# Patient Record
Sex: MALE | Race: BLACK OR AFRICAN AMERICAN | NOT HISPANIC OR LATINO | Employment: OTHER | URBAN - METROPOLITAN AREA
[De-identification: names, ages, dates, MRNs, and addresses within clinical notes are randomized per-mention and may not be internally consistent; named-entity substitution may affect disease eponyms.]

---

## 2023-08-02 ENCOUNTER — HOSPITAL ENCOUNTER (OUTPATIENT)
Dept: DATA CONVERSION | Facility: HOSPITAL | Age: 78
End: 2023-08-02
Attending: INTERNAL MEDICINE | Admitting: INTERNAL MEDICINE
Payer: COMMERCIAL

## 2023-08-02 DIAGNOSIS — I25.5 ISCHEMIC CARDIOMYOPATHY: ICD-10-CM

## 2023-08-02 DIAGNOSIS — I25.82 CHRONIC TOTAL OCCLUSION OF CORONARY ARTERY: ICD-10-CM

## 2023-08-02 DIAGNOSIS — I10 ESSENTIAL (PRIMARY) HYPERTENSION: ICD-10-CM

## 2023-08-02 DIAGNOSIS — I42.9 CARDIOMYOPATHY, UNSPECIFIED (MULTI): ICD-10-CM

## 2023-08-02 DIAGNOSIS — I48.91 UNSPECIFIED ATRIAL FIBRILLATION (MULTI): ICD-10-CM

## 2023-08-02 DIAGNOSIS — I20.89 OTHER FORMS OF ANGINA PECTORIS (CMS-HCC): ICD-10-CM

## 2023-08-02 DIAGNOSIS — I25.118 ATHEROSCLEROTIC HEART DISEASE OF NATIVE CORONARY ARTERY WITH OTHER FORMS OF ANGINA PECTORIS (CMS-HCC): ICD-10-CM

## 2023-08-02 DIAGNOSIS — E78.5 HYPERLIPIDEMIA, UNSPECIFIED: ICD-10-CM

## 2023-08-02 DIAGNOSIS — E11.9 TYPE 2 DIABETES MELLITUS WITHOUT COMPLICATIONS (MULTI): ICD-10-CM

## 2023-08-02 DIAGNOSIS — I25.10 ATHEROSCLEROTIC HEART DISEASE OF NATIVE CORONARY ARTERY WITHOUT ANGINA PECTORIS: ICD-10-CM

## 2023-08-05 LAB
ATRIAL RATE: 62 BPM
P AXIS: 33 DEGREES
P OFFSET: 171 MS
P ONSET: 129 MS
PR INTERVAL: 232 MS
Q ONSET: 212 MS
QRS COUNT: 10 BEATS
QRS DURATION: 98 MS
QT INTERVAL: 462 MS
QTC CALCULATION(BAZETT): 468 MS
QTC FREDERICIA: 467 MS
R AXIS: -38 DEGREES
T AXIS: 231 DEGREES
T OFFSET: 443 MS
VENTRICULAR RATE: 62 BPM

## 2023-09-29 VITALS — HEIGHT: 70 IN | WEIGHT: 163.14 LBS | BODY MASS INDEX: 23.36 KG/M2

## 2023-11-02 PROBLEM — Z95.810 ICD (IMPLANTABLE CARDIOVERTER-DEFIBRILLATOR) IN PLACE: Status: ACTIVE | Noted: 2023-11-02

## 2023-11-02 PROBLEM — I25.5 ISCHEMIC CARDIOMYOPATHY: Status: ACTIVE | Noted: 2023-11-02

## 2023-11-02 PROBLEM — I65.23 STENOSIS OF BOTH EXTERNAL CAROTID ARTERIES: Status: ACTIVE | Noted: 2023-11-02

## 2023-11-02 PROBLEM — I48.0 PAROXYSMAL ATRIAL FIBRILLATION WITH RAPID VENTRICULAR RESPONSE (MULTI): Status: ACTIVE | Noted: 2023-11-02

## 2023-11-02 PROBLEM — E11.9 TYPE 2 DIABETES MELLITUS (MULTI): Status: ACTIVE | Noted: 2023-11-02

## 2023-11-02 PROBLEM — R06.00 DYSPNEA: Status: ACTIVE | Noted: 2023-11-02

## 2023-11-02 PROBLEM — I47.20 VENTRICULAR TACHYCARDIA (PAROXYSMAL): Status: ACTIVE | Noted: 2023-11-02

## 2023-11-02 PROBLEM — I10 ESSENTIAL HYPERTENSION, BENIGN: Status: ACTIVE | Noted: 2023-11-02

## 2023-11-02 PROBLEM — Z87.891 FORMER SMOKER: Status: ACTIVE | Noted: 2023-11-02

## 2023-11-02 PROBLEM — I47.29 VENTRICULAR TACHYCARDIA (PAROXYSMAL) (MULTI): Status: ACTIVE | Noted: 2023-11-02

## 2023-11-02 PROBLEM — I25.810 CORONARY ARTERY DISEASE INVOLVING CORONARY BYPASS GRAFT OF NATIVE HEART: Status: ACTIVE | Noted: 2023-11-02

## 2023-11-02 PROBLEM — Z98.61 HISTORY OF PTCA: Status: ACTIVE | Noted: 2023-11-02

## 2023-11-02 RX ORDER — FLUTICASONE PROPIONATE 50 MCG
1 SPRAY, SUSPENSION (ML) NASAL 2 TIMES DAILY
COMMUNITY
Start: 2023-01-13

## 2023-11-02 RX ORDER — EZETIMIBE 10 MG/1
1 TABLET ORAL NIGHTLY
COMMUNITY
Start: 2021-01-15

## 2023-11-02 RX ORDER — SPIRONOLACTONE 25 MG/1
25 TABLET ORAL DAILY
COMMUNITY
End: 2023-11-03 | Stop reason: ALTCHOICE

## 2023-11-02 RX ORDER — MECLIZINE HYDROCHLORIDE 25 MG/1
1 TABLET ORAL 3 TIMES DAILY PRN
COMMUNITY
Start: 2021-05-21

## 2023-11-02 RX ORDER — AMIODARONE HYDROCHLORIDE 200 MG/1
200 TABLET ORAL 2 TIMES DAILY
COMMUNITY

## 2023-11-02 RX ORDER — CETIRIZINE HYDROCHLORIDE 10 MG/1
10 TABLET ORAL DAILY
COMMUNITY
Start: 2023-01-13 | End: 2023-11-03 | Stop reason: ALTCHOICE

## 2023-11-02 RX ORDER — NITROGLYCERIN 0.4 MG/1
TABLET SUBLINGUAL
COMMUNITY

## 2023-11-02 RX ORDER — SACUBITRIL AND VALSARTAN 24; 26 MG/1; MG/1
1 TABLET, FILM COATED ORAL DAILY
COMMUNITY

## 2023-11-02 RX ORDER — LINAGLIPTIN 5 MG/1
5 TABLET, FILM COATED ORAL DAILY
COMMUNITY

## 2023-11-02 RX ORDER — ALOGLIPTIN 12.5 MG/1
1 TABLET, FILM COATED ORAL DAILY
COMMUNITY
End: 2023-11-03 | Stop reason: ALTCHOICE

## 2023-11-02 RX ORDER — PANTOPRAZOLE SODIUM 40 MG/1
1 TABLET, DELAYED RELEASE ORAL DAILY
COMMUNITY
Start: 2021-03-23 | End: 2023-11-03 | Stop reason: ALTCHOICE

## 2023-11-02 RX ORDER — IPRATROPIUM BROMIDE 21 UG/1
2 SPRAY, METERED NASAL 2 TIMES DAILY
COMMUNITY
Start: 2023-03-14 | End: 2023-11-03 | Stop reason: ALTCHOICE

## 2023-11-02 RX ORDER — ALPRAZOLAM 1 MG/1
1 TABLET ORAL NIGHTLY PRN
COMMUNITY
Start: 2020-11-24

## 2023-11-02 RX ORDER — METFORMIN HYDROCHLORIDE 1000 MG/1
500 TABLET ORAL 2 TIMES DAILY
COMMUNITY
Start: 2020-07-22 | End: 2024-05-07 | Stop reason: ALTCHOICE

## 2023-11-02 RX ORDER — CLOPIDOGREL BISULFATE 75 MG/1
75 TABLET ORAL DAILY
COMMUNITY
End: 2023-11-29

## 2023-11-02 RX ORDER — SILDENAFIL 100 MG/1
TABLET, FILM COATED ORAL
COMMUNITY

## 2023-11-02 RX ORDER — GLIPIZIDE 10 MG/1
1 TABLET, FILM COATED, EXTENDED RELEASE ORAL 2 TIMES DAILY
COMMUNITY
Start: 2020-04-28 | End: 2023-11-03 | Stop reason: ALTCHOICE

## 2023-11-02 RX ORDER — ASPIRIN 81 MG/1
1 TABLET ORAL DAILY
COMMUNITY
End: 2023-11-03 | Stop reason: ALTCHOICE

## 2023-11-02 RX ORDER — ATORVASTATIN CALCIUM 40 MG/1
1 TABLET, FILM COATED ORAL NIGHTLY
COMMUNITY
Start: 2021-01-18

## 2023-11-02 RX ORDER — BENZONATATE 200 MG/1
200 CAPSULE ORAL 3 TIMES DAILY PRN
COMMUNITY
Start: 2023-01-13 | End: 2024-02-20 | Stop reason: WASHOUT

## 2023-11-02 RX ORDER — TRAZODONE HYDROCHLORIDE 50 MG/1
50 TABLET ORAL NIGHTLY
COMMUNITY
Start: 2023-02-14 | End: 2023-11-03 | Stop reason: ALTCHOICE

## 2023-11-02 RX ORDER — SOTALOL HYDROCHLORIDE 80 MG/1
80 TABLET ORAL EVERY 12 HOURS
COMMUNITY
End: 2023-11-03 | Stop reason: ALTCHOICE

## 2023-11-03 ENCOUNTER — OFFICE VISIT (OUTPATIENT)
Dept: CARDIOLOGY | Facility: CLINIC | Age: 78
End: 2023-11-03
Payer: MEDICARE

## 2023-11-03 VITALS
SYSTOLIC BLOOD PRESSURE: 100 MMHG | HEART RATE: 84 BPM | WEIGHT: 166.4 LBS | BODY MASS INDEX: 23.88 KG/M2 | DIASTOLIC BLOOD PRESSURE: 60 MMHG

## 2023-11-03 DIAGNOSIS — E78.2 MIXED HYPERLIPIDEMIA: ICD-10-CM

## 2023-11-03 DIAGNOSIS — Z95.810 ICD (IMPLANTABLE CARDIOVERTER-DEFIBRILLATOR) IN PLACE: ICD-10-CM

## 2023-11-03 DIAGNOSIS — I46.9 CARDIAC ARREST (MULTI): ICD-10-CM

## 2023-11-03 DIAGNOSIS — I65.23 STENOSIS OF BOTH EXTERNAL CAROTID ARTERIES: ICD-10-CM

## 2023-11-03 DIAGNOSIS — I77.9 CAROTID ARTERY DISEASE, UNSPECIFIED LATERALITY, UNSPECIFIED TYPE (CMS-HCC): ICD-10-CM

## 2023-11-03 DIAGNOSIS — I10 ESSENTIAL HYPERTENSION, BENIGN: ICD-10-CM

## 2023-11-03 DIAGNOSIS — I47.29 VENTRICULAR TACHYCARDIA (PAROXYSMAL) (MULTI): ICD-10-CM

## 2023-11-03 DIAGNOSIS — E11.9 TYPE 2 DIABETES MELLITUS WITHOUT COMPLICATION, WITHOUT LONG-TERM CURRENT USE OF INSULIN (MULTI): ICD-10-CM

## 2023-11-03 DIAGNOSIS — E11.22 CHRONIC KIDNEY DISEASE DUE TO TYPE 2 DIABETES MELLITUS (MULTI): ICD-10-CM

## 2023-11-03 DIAGNOSIS — I25.810 CORONARY ARTERY DISEASE INVOLVING CORONARY BYPASS GRAFT OF NATIVE HEART WITHOUT ANGINA PECTORIS: ICD-10-CM

## 2023-11-03 DIAGNOSIS — Z87.891 FORMER SMOKER: ICD-10-CM

## 2023-11-03 DIAGNOSIS — Z98.61 CAD S/P PERCUTANEOUS CORONARY ANGIOPLASTY: ICD-10-CM

## 2023-11-03 DIAGNOSIS — I25.10 CAD S/P PERCUTANEOUS CORONARY ANGIOPLASTY: ICD-10-CM

## 2023-11-03 DIAGNOSIS — I25.5 ISCHEMIC CARDIOMYOPATHY: ICD-10-CM

## 2023-11-03 DIAGNOSIS — K21.9 GASTROESOPHAGEAL REFLUX DISEASE, UNSPECIFIED WHETHER ESOPHAGITIS PRESENT: ICD-10-CM

## 2023-11-03 PROBLEM — F41.9 ANXIETY: Status: ACTIVE | Noted: 2023-11-03

## 2023-11-03 PROBLEM — K92.2 GI BLEED: Status: ACTIVE | Noted: 2020-11-24

## 2023-11-03 PROBLEM — K21.00 GASTRO-ESOPHAGEAL REFLUX DISEASE WITH ESOPHAGITIS: Status: ACTIVE | Noted: 2021-05-20

## 2023-11-03 PROBLEM — E78.5 HYPERLIPIDEMIA: Status: ACTIVE | Noted: 2023-01-13

## 2023-11-03 PROBLEM — I25.9 CHRONIC ISCHEMIC HEART DISEASE, UNSPECIFIED: Status: ACTIVE | Noted: 2023-11-03

## 2023-11-03 PROBLEM — G51.0 BELL'S PALSY: Status: ACTIVE | Noted: 2021-05-20

## 2023-11-03 PROCEDURE — 99214 OFFICE O/P EST MOD 30 MIN: CPT | Performed by: INTERNAL MEDICINE

## 2023-11-03 PROCEDURE — 3074F SYST BP LT 130 MM HG: CPT | Performed by: INTERNAL MEDICINE

## 2023-11-03 PROCEDURE — 3078F DIAST BP <80 MM HG: CPT | Performed by: INTERNAL MEDICINE

## 2023-11-03 PROCEDURE — 1036F TOBACCO NON-USER: CPT | Performed by: INTERNAL MEDICINE

## 2023-11-03 PROCEDURE — 1159F MED LIST DOCD IN RCRD: CPT | Performed by: INTERNAL MEDICINE

## 2023-11-03 NOTE — PROGRESS NOTES
Referred by Dr. Bruno ref. provider found provider found for   Chief Complaint   Patient presents with    Follow-up     3 month        History of Present Illness  Henry Ortiz is a 78 y.o. year old male patient status post bypass surgery status post ICD implantation.  He had ICD shock back in August cardiac catheterization showed no change in his graft.  He had ischemic or myopathy.  He has been doing well no complaint no symptoms of chest pain or shortness of breath he is taking amiodarone.  I discussed with the patient in length we will continue medication will call for any problem and follow-up as scheduled.  The patient been followed by EP service    Past Medical History  No past medical history on file.    Social History  Social History     Tobacco Use    Smoking status: Former     Packs/day: 1.00     Years: 20.00     Additional pack years: 0.00     Total pack years: 20.00     Types: Cigarettes     Quit date:      Years since quittin.8    Smokeless tobacco: Never   Substance Use Topics    Alcohol use: Not Currently    Drug use: Not Currently       Family History     Family History   Problem Relation Name Age of Onset    Heart attack Mother          acute    Cancer Father      Coronary artery disease Sister         Review of Systems  As per HPI, all other systems reviewed and negative.    Allergies:  Allergies   Allergen Reactions    Bee Venom Protein (Honey Bee) Other    Alogliptin Other        Outpatient Medications:  Current Outpatient Medications   Medication Instructions    ALPRAZolam (XANAX) 1 mg, oral, Nightly PRN    amiodarone (PACERONE) 200 mg, oral, 2 times daily, Through 23, then take 1 tab daily thereafter    apixaban (ELIQUIS) 5 mg, oral, 2 times daily    atorvastatin (Lipitor) 40 mg tablet 1 tablet, oral, Nightly    benzonatate (TESSALON) 200 mg, oral, 3 times daily PRN    clopidogrel (PLAVIX) 75 mg, oral, Daily    empagliflozin (JARDIANCE) 12.5 mg, oral, Daily    ezetimibe (Zetia)  10 mg tablet 1 tablet, oral, Nightly    fluticasone (Flonase) 50 mcg/actuation nasal spray 1 spray, Each Nostril, 2 times daily    meclizine (Antivert) 25 mg tablet 1 tablet, oral, 3 times daily PRN    metFORMIN (GLUCOPHAGE) 500 mg, oral, 2 times daily    nitroglycerin (Nitrostat) 0.4 mg SL tablet sublingual    sacubitriL-valsartan (Entresto) 24-26 mg tablet 1 tablet, oral, Daily    sildenafil (Viagra) 100 mg tablet oral, AS DIRECTED.    Tradjenta 5 mg, oral, Daily         Vitals:  Vitals:    11/03/23 1537   BP: 100/60   Pulse: 84       Physical Exam:  Physical Exam  Constitutional:       Appearance: Normal appearance.   HENT:      Head: Normocephalic and atraumatic.   Eyes:      Extraocular Movements: Extraocular movements intact.      Pupils: Pupils are equal, round, and reactive to light.   Cardiovascular:      Rate and Rhythm: Normal rate and regular rhythm.      Pulses: Normal pulses.      Heart sounds: Normal heart sounds.   Pulmonary:      Effort: Pulmonary effort is normal.      Breath sounds: Normal breath sounds.   Abdominal:      General: Abdomen is flat.      Palpations: Abdomen is soft.   Musculoskeletal:      Right lower leg: No edema.      Left lower leg: No edema.   Skin:     General: Skin is warm and dry.   Neurological:      General: No focal deficit present.      Mental Status: He is alert and oriented to person, place, and time.             Assessment/Plan   Diagnoses and all orders for this visit:  CAD S/P percutaneous coronary angioplasty  Cardiac arrest (CMS/HCC)  Essential hypertension, benign  Coronary artery disease involving coronary bypass graft of native heart without angina pectoris  Carotid artery disease, unspecified laterality, unspecified type (CMS/HCC)  Ventricular tachycardia (paroxysmal) (CMS/HCC)  Stenosis of both external carotid arteries  Ischemic cardiomyopathy  ICD (implantable cardioverter-defibrillator) in place  Mixed hyperlipidemia  Type 2 diabetes mellitus without  complication, without long-term current use of insulin (CMS/[facility])  Gastroesophageal reflux disease, unspecified whether esophagitis present  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  Interventional Cardiology   of [facility]     Thank you for allowing me to participate in the care of this patient. Please do not hesitate to contact me with any further questions or concerns.

## 2023-11-03 NOTE — PATIENT INSTRUCTIONS
Patient to follow up in 6 months with Dr. Remington Nelson MD [facility]     No changes to plans today.   Continue same medications and treatments.   Patient educated on proper medication use.   Patient educated on risk factor modification.   Please bring any lab results from other providers / physicians to your next appointment.     Please bring all medicines, vitamins, and herbal supplements with you when you come to the office.     Prescriptions will not be filled unless you are compliant with your follow up appointments or have a follow up appointment scheduled as per instruction of your physician. Refills should be requested at the time of your visit.    Angel JIMENEZ RN am scribing for and in the presence of Dr. [facility]

## 2023-11-27 DIAGNOSIS — Z98.61 CAD S/P PERCUTANEOUS CORONARY ANGIOPLASTY: ICD-10-CM

## 2023-11-27 DIAGNOSIS — I25.10 CAD S/P PERCUTANEOUS CORONARY ANGIOPLASTY: ICD-10-CM

## 2023-11-29 RX ORDER — CLOPIDOGREL BISULFATE 75 MG/1
75 TABLET ORAL DAILY
Qty: 90 TABLET | Refills: 3 | Status: SHIPPED | OUTPATIENT
Start: 2023-11-29

## 2023-12-13 ENCOUNTER — HOSPITAL ENCOUNTER (OUTPATIENT)
Dept: CARDIOLOGY | Facility: HOSPITAL | Age: 78
Discharge: HOME | End: 2023-12-13
Payer: MEDICARE

## 2023-12-13 DIAGNOSIS — Z95.810 ICD (IMPLANTABLE CARDIOVERTER-DEFIBRILLATOR) IN PLACE: ICD-10-CM

## 2023-12-13 DIAGNOSIS — I42.0 PRIMARY IDIOPATHIC DILATED CARDIOMYOPATHY (MULTI): ICD-10-CM

## 2023-12-13 DIAGNOSIS — Z95.810 ICD (IMPLANTABLE CARDIOVERTER-DEFIBRILLATOR) IN PLACE: Primary | ICD-10-CM

## 2023-12-13 PROCEDURE — 93296 REM INTERROG EVL PM/IDS: CPT

## 2023-12-13 PROCEDURE — 93295 DEV INTERROG REMOTE 1/2/MLT: CPT | Performed by: INTERNAL MEDICINE

## 2024-02-20 ENCOUNTER — OFFICE VISIT (OUTPATIENT)
Dept: CARDIOLOGY | Facility: CLINIC | Age: 79
End: 2024-02-20
Payer: COMMERCIAL

## 2024-02-20 ENCOUNTER — ANCILLARY PROCEDURE (OUTPATIENT)
Dept: CARDIOLOGY | Facility: CLINIC | Age: 79
End: 2024-02-20
Payer: COMMERCIAL

## 2024-02-20 VITALS
BODY MASS INDEX: 24.37 KG/M2 | SYSTOLIC BLOOD PRESSURE: 140 MMHG | WEIGHT: 170.2 LBS | TEMPERATURE: 97.3 F | DIASTOLIC BLOOD PRESSURE: 90 MMHG | HEIGHT: 70 IN | HEART RATE: 85 BPM

## 2024-02-20 DIAGNOSIS — Z95.810 PRESENCE OF AUTOMATIC CARDIOVERTER/DEFIBRILLATOR (AICD): ICD-10-CM

## 2024-02-20 DIAGNOSIS — I42.9 PRIMARY CARDIOMYOPATHY (MULTI): ICD-10-CM

## 2024-02-20 DIAGNOSIS — Z95.810 ICD (IMPLANTABLE CARDIOVERTER-DEFIBRILLATOR) IN PLACE: Primary | ICD-10-CM

## 2024-02-20 PROCEDURE — 3080F DIAST BP >= 90 MM HG: CPT | Performed by: INTERNAL MEDICINE

## 2024-02-20 PROCEDURE — 93283 PRGRMG EVAL IMPLANTABLE DFB: CPT | Performed by: INTERNAL MEDICINE

## 2024-02-20 PROCEDURE — 1159F MED LIST DOCD IN RCRD: CPT | Performed by: INTERNAL MEDICINE

## 2024-02-20 PROCEDURE — 1036F TOBACCO NON-USER: CPT | Performed by: INTERNAL MEDICINE

## 2024-02-20 PROCEDURE — 3077F SYST BP >= 140 MM HG: CPT | Performed by: INTERNAL MEDICINE

## 2024-02-20 PROCEDURE — 99213 OFFICE O/P EST LOW 20 MIN: CPT | Performed by: INTERNAL MEDICINE

## 2024-02-20 PROCEDURE — 93290 INTERROG DEV EVAL ICPMS IP: CPT | Performed by: INTERNAL MEDICINE

## 2024-02-20 ASSESSMENT — PATIENT HEALTH QUESTIONNAIRE - PHQ9
2. FEELING DOWN, DEPRESSED OR HOPELESS: NOT AT ALL
1. LITTLE INTEREST OR PLEASURE IN DOING THINGS: NOT AT ALL
SUM OF ALL RESPONSES TO PHQ9 QUESTIONS 1 AND 2: 0

## 2024-02-20 NOTE — PROGRESS NOTES
Subjective:  The patient is a 78-year-old -American gentleman who presents for ICD follow-up.  He has known coronary disease, status post CABG surgeries in 1991 and again in 1999.  He has had multiple percutaneous interventions as well.  He was catheterized by Dr. Nelson in September 2022 and again in August 2023, showing occlusion of all 3 native vessels but good patency of a DELILAH graft to the RCA and SVG to the LAD.  His LVEF is 35 to 45%.    In 2019, the patient had classic Wenke Bach second-degree AV block but also had sustained monomorphic VT following RCA intervention.  I implanted a Medtronic DDDR ICD and started the patient on empiric sotalol.  He had appropriate life-saving shocks on 7/10/2023 and again on 8/30/2023, with ventricular fibrillation on each occasion.  In September 2023, his sotalol was switched to amiodarone, which she has continued since then.  He declined to go back into cardiac rehab, as one of his events occurred in that setting.  He is able to do normal activities without limitations, and is hoping to get back into playing golf this year.    Current Outpatient Medications   Medication Sig    ALPRAZolam (Xanax) 1 mg tablet Take 1 tablet (1 mg) by mouth as needed at bedtime for anxiety or sleep.    amiodarone (Pacerone) 200 mg tablet Take 1 tablet (200 mg) by mouth 2 times a day. Through 9/30/23, then take 1 tab daily thereafter    apixaban (Eliquis) 5 mg tablet Take 1 tablet (5 mg) by mouth 2 times a day.    atorvastatin (Lipitor) 40 mg tablet Take 1 tablet (40 mg) by mouth once daily at bedtime.    clopidogrel (Plavix) 75 mg tablet TAKE 1 TABLET EVERY DAY    empagliflozin (Jardiance) 25 mg Take 0.5 tablets (12.5 mg) by mouth once daily.    ezetimibe (Zetia) 10 mg tablet Take 1 tablet (10 mg) by mouth once daily at bedtime.    fluticasone (Flonase) 50 mcg/actuation nasal spray Administer 1 spray into each nostril 2 times a day. prn    linaGLIPtin (Tradjenta) 5 mg tablet Take 1  tablet (5 mg) by mouth once daily.    meclizine (Antivert) 25 mg tablet Take 1 tablet (25 mg) by mouth 3 times a day as needed for dizziness or nausea.    metFORMIN (Glucophage) 1,000 mg tablet Take 0.5 tablets (500 mg) by mouth twice a day.    nitroglycerin (Nitrostat) 0.4 mg SL tablet Place under the tongue.    sacubitriL-valsartan (Entresto) 24-26 mg tablet Take 1 tablet by mouth once daily.    sildenafil (Viagra) 100 mg tablet Take by mouth. AS DIRECTED.     Allergies:  Bee venom protein (honey bee) and Alogliptin     Patient Active Problem List   Diagnosis    Coronary artery disease involving coronary bypass graft of native heart    Dyspnea    Essential hypertension, benign    ICD (implantable cardioverter-defibrillator) in place    Ischemic cardiomyopathy    Stenosis of both external carotid arteries    Type 2 diabetes mellitus (CMS/HCC)    Ventricular tachycardia (paroxysmal) (CMS/HCC)    Paroxysmal atrial fibrillation with rapid ventricular response (CMS/HCC)    Former smoker    Bell's palsy    Anxiety    Cardiac arrest (CMS/HCC)    Carotid artery disease (CMS/HCC)    Chronic ischemic heart disease, unspecified    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 (CMS/HCC)    Gastro-esophageal reflux disease with esophagitis    GERD (gastroesophageal reflux disease)    GI bleed    Hyperlipidemia    CAD S/P percutaneous coronary angioplasty    BMI 23.0-23.9, adult     Past Surgical History:   Procedure Laterality Date    OTHER SURGICAL HISTORY  10/11/2021    Cardiac catheterization    OTHER SURGICAL HISTORY  10/11/2021    Facial surgery    OTHER SURGICAL HISTORY  10/11/2021    Foot surgery    OTHER SURGICAL HISTORY  10/11/2021    Percutaneous transluminal coronary angioplasty    OTHER SURGICAL HISTORY  10/11/2021    Hernia repair    OTHER SURGICAL HISTORY  10/11/2021    Colonoscopy    OTHER SURGICAL HISTORY  10/11/2021    Surgery    OTHER SURGICAL HISTORY  10/11/2021    Bladder surgery    OTHER SURGICAL  "HISTORY  10/11/2021    Tonsillectomy    OTHER SURGICAL HISTORY  10/11/2021    Prostate biopsy    OTHER SURGICAL HISTORY  10/11/2021    Pacemaker insertion    OTHER SURGICAL HISTORY  04/26/2022    Renal lithotripsy     Objective:  Vitals:    02/20/24 1409   BP: 140/90   Pulse: 85   Temp: 36.3 °C (97.3 °F)   Height:     1.778 m (5' 10\")  Weight: 77.2 kg (170 lb 3.2 oz)     Exam:  Gen: Very pleasant gentleman in no distress.  HEENT: No scleral icterus.  Neck: No jugular venous distention or thyromegaly.  Left subclavian ICD pocket: Normal in appearance.  Lungs: Clear to auscultation, with no wheezes or rales.  Heart: Regular rhythm with laterally displaced PMI but no murmurs or gallops.  Abdomen: Benign, with no organomegaly or masses.  Extremities: Intact distal pulses; no edema.  Neuro: No focal neurologic abnormalities.  Skin: No cutaneous lesions.    Device Check:  A Medtronic ICD check was done today and demonstrated normal device function.  The unit is programmed for AAIR (MVP) pacing between 60 bpm and 120 bpm, which provides nearly 55% atrial pacing and nearly 9% ventricular pacing.  The lead parameters were good.  There was no burden of atrial fibrillation.  The battery longevity is estimated at 4.1 years.    Impressions:  1.  Coronary artery disease, status post remote CABG surgeries and RCA stenting in 2019, with stable anatomy by catheterization in 2023.  2.  Chronic ischemic cardiomyopathy with LVEF 35-45%, but minimal heart failure symptoms (NYHA functional class I).  3.  Ventricular arrhythmias with monomorphic VT in December 2019, and ventricular fibrillation episodes on 2 occasions in the summer 2023.  He is now on amiodarone for suppression of all of these arrhythmias and doing well.  4.  Status post Medtronic DDDR ICD in December 2019, with 2 life-saving shocks in 2023.  The device function is normal, roughly MCC to the point of battery depletion.  5.  Paroxysmal atrial fibrillation by history, " with no burden of this rhythm on low-dose amiodarone.  He has a [id number] score of at least 6 (hypertension, diabetes, CAD, CHF, and 2 points for age over 75), and he is appropriately anticoagulated with Eliquis.  6.  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    Recommendations:  1.  The patient will have his device checked remotely every 3 months and he will see me in the office on an annual basis.  2.  He will follow-up with Dr. Mccabe as scheduled.  3.  I recommend liver and thyroid function testing at least once annually because of the ongoing amiodarone therapy.      Cirilo Rodriguez MD

## 2024-03-22 ENCOUNTER — APPOINTMENT (OUTPATIENT)
Dept: CARDIOLOGY | Facility: CLINIC | Age: 79
End: 2024-03-22
Payer: COMMERCIAL

## 2024-05-07 ENCOUNTER — OFFICE VISIT (OUTPATIENT)
Dept: CARDIOLOGY | Facility: CLINIC | Age: 79
End: 2024-05-07
Payer: COMMERCIAL

## 2024-05-07 VITALS
HEART RATE: 66 BPM | HEIGHT: 70 IN | WEIGHT: 170.3 LBS | DIASTOLIC BLOOD PRESSURE: 66 MMHG | BODY MASS INDEX: 24.38 KG/M2 | SYSTOLIC BLOOD PRESSURE: 104 MMHG

## 2024-05-07 DIAGNOSIS — E11.9 TYPE 2 DIABETES MELLITUS WITHOUT COMPLICATION, WITHOUT LONG-TERM CURRENT USE OF INSULIN (MULTI): ICD-10-CM

## 2024-05-07 DIAGNOSIS — Z87.891 FORMER SMOKER: ICD-10-CM

## 2024-05-07 DIAGNOSIS — I25.5 ISCHEMIC CARDIOMYOPATHY: ICD-10-CM

## 2024-05-07 DIAGNOSIS — I65.23 STENOSIS OF BOTH EXTERNAL CAROTID ARTERIES: ICD-10-CM

## 2024-05-07 DIAGNOSIS — I48.0 PAROXYSMAL ATRIAL FIBRILLATION WITH RAPID VENTRICULAR RESPONSE (MULTI): ICD-10-CM

## 2024-05-07 DIAGNOSIS — E11.22 CHRONIC KIDNEY DISEASE DUE TO TYPE 2 DIABETES MELLITUS (MULTI): ICD-10-CM

## 2024-05-07 DIAGNOSIS — Z98.61 CAD S/P PERCUTANEOUS CORONARY ANGIOPLASTY: ICD-10-CM

## 2024-05-07 DIAGNOSIS — I25.810 CORONARY ARTERY DISEASE INVOLVING CORONARY BYPASS GRAFT OF NATIVE HEART WITHOUT ANGINA PECTORIS: ICD-10-CM

## 2024-05-07 DIAGNOSIS — E78.2 MIXED HYPERLIPIDEMIA: ICD-10-CM

## 2024-05-07 DIAGNOSIS — I10 ESSENTIAL HYPERTENSION, BENIGN: ICD-10-CM

## 2024-05-07 DIAGNOSIS — Z95.810 ICD (IMPLANTABLE CARDIOVERTER-DEFIBRILLATOR) IN PLACE: ICD-10-CM

## 2024-05-07 DIAGNOSIS — I25.10 CAD S/P PERCUTANEOUS CORONARY ANGIOPLASTY: ICD-10-CM

## 2024-05-07 PROBLEM — I25.9 CHRONIC ISCHEMIC HEART DISEASE, UNSPECIFIED: Status: RESOLVED | Noted: 2023-11-03 | Resolved: 2024-05-07

## 2024-05-07 PROCEDURE — 3078F DIAST BP <80 MM HG: CPT | Performed by: INTERNAL MEDICINE

## 2024-05-07 PROCEDURE — 1159F MED LIST DOCD IN RCRD: CPT | Performed by: INTERNAL MEDICINE

## 2024-05-07 PROCEDURE — 3074F SYST BP LT 130 MM HG: CPT | Performed by: INTERNAL MEDICINE

## 2024-05-07 PROCEDURE — 1036F TOBACCO NON-USER: CPT | Performed by: INTERNAL MEDICINE

## 2024-05-07 PROCEDURE — 99214 OFFICE O/P EST MOD 30 MIN: CPT | Performed by: INTERNAL MEDICINE

## 2024-05-07 NOTE — PROGRESS NOTES
Referred by Dr. Bruno ref. provider found provider found for   Chief Complaint   Patient presents with    Follow-up     Pt is following up after 6 months         History of Present Illness  Henry Ortiz is a 78 y.o. year old male patient doing well from cardiac standpoint feeling much better better on Entresto less short of breath and he is able to do more in his ER workup.  Did not have any symptoms of chest pain did not have any further shock of his ICD.  [facility] and [facility] that his GFR is slightly low PSA was elevated.  Advised him to follow-up with urology also to call his primary care physician regarding metformin use when the GFR is on the lower side.  He will follow-up with me in 6 months    Past Medical History  No past medical history on file.    Social History  Social History     Tobacco Use    Smoking status: Former     Current packs/day: 0.00     Average packs/day: 1 pack/day for 20.0 years (20.0 ttl pk-yrs)     Types: Cigarettes     Start date:      Quit date:      Years since quittin.3    Smokeless tobacco: Never   Substance Use Topics    Alcohol use: Yes     Comment: rare    Drug use: Not Currently       Family History     Family History   Problem Relation Name Age of Onset    Heart attack Mother          acute    Cancer Father      Coronary artery disease Sister         Review of Systems  As per HPI, all other systems reviewed and negative.    Allergies:  Allergies   Allergen Reactions    Bee Venom Protein (Honey Bee) Other    Alogliptin Other        Outpatient Medications:  Current Outpatient Medications   Medication Instructions    ALPRAZolam (XANAX) 1 mg, oral, Nightly PRN    amiodarone (PACERONE) 200 mg, oral, 2 times daily, Through 23, then take 1 tab daily thereafter    apixaban (ELIQUIS) 5 mg, oral, 2 times daily    atorvastatin (Lipitor) 40 mg tablet 1 tablet, oral, Nightly    clopidogrel (PLAVIX) 75 mg, oral, Daily    empagliflozin (JARDIANCE)  12.5 mg, oral, Daily    ezetimibe (Zetia) 10 mg tablet 1 tablet, oral, Nightly    fluticasone (Flonase) 50 mcg/actuation nasal spray 1 spray, Each Nostril, 2 times daily, prn    meclizine (Antivert) 25 mg tablet 1 tablet, oral, 3 times daily PRN    nitroglycerin (Nitrostat) 0.4 mg SL tablet sublingual    sacubitriL-valsartan (Entresto) 24-26 mg tablet 1 tablet, oral, Daily    sildenafil (Viagra) 100 mg tablet oral, AS DIRECTED.    Tradjenta 5 mg, oral, Daily         Vitals:  Vitals:    05/07/24 1111   BP: 104/66   Pulse: 66       Physical Exam:  Physical Exam  Constitutional:       Appearance: Normal appearance.   HENT:      Head: Normocephalic and atraumatic.   Eyes:      Extraocular Movements: Extraocular movements intact.      Pupils: Pupils are equal, round, and reactive to light.   Cardiovascular:      Rate and Rhythm: Normal rate and regular rhythm.      Pulses: Normal pulses.      Heart sounds: Normal heart sounds.   Pulmonary:      Effort: Pulmonary effort is normal.      Breath sounds: Normal breath sounds.   Abdominal:      General: Abdomen is flat.      Palpations: Abdomen is soft.   Musculoskeletal:      Right lower leg: No edema.      Left lower leg: No edema.   Skin:     General: Skin is warm and dry.   Neurological:      General: No focal deficit present.      Mental Status: He is alert and oriented to person, place, and time.             Assessment/Plan   Diagnoses and all orders for this visit:  CAD S/P percutaneous coronary angioplasty  Coronary artery disease involving coronary bypass graft of native heart without angina pectoris  ICD (implantable cardioverter-defibrillator) in place  Paroxysmal atrial fibrillation with rapid ventricular response (Multi)  Stenosis of both external carotid arteries  Ischemic cardiomyopathy  Essential hypertension, benign  Mixed hyperlipidemia  Type 2 diabetes mellitus without complication, without long-term current use of insulin (Multi)  Chronic kidney disease due  to type 2 diabetes mellitus (Multi)  Former smoker  BMI 24.0-24.9, adult          Remington Nelson MD [facility]  Interventional Cardiology   of [facility]     Thank you for allowing me to participate in the care of this patient. Please do not hesitate to contact me with any further questions or concerns.

## 2024-05-07 NOTE — PATIENT INSTRUCTIONS
Patient to follow up in 6 months with Dr. Remington Nelson MD [facility]   Continue your follow ups with Dr. Michael MD as scheduled.     [facility] and tell [facility] that you would like to stop Metformin due to your kidneys and start something else.     Please do not take nitroglycerin and Viagra with in the same 24 hours.     No other changes today.   Continue same medications and treatments.   Patient educated on proper medication use.   Patient educated on risk factor modification.   Please bring any lab results from other providers / physicians to your next appointment.     Please bring all medicines, vitamins, and herbal supplements with you when you come to the office.     Prescriptions will not be filled unless you are compliant with your follow up appointments or have a follow up appointment scheduled as per instruction of your physician. Refills should be requested at the time of your visit.    Angel JIMENEZ RN am scribing for and in the presence of Dr. Remington Nelson MD [facility]

## 2024-05-23 ENCOUNTER — HOSPITAL ENCOUNTER (OUTPATIENT)
Dept: CARDIOLOGY | Facility: HOSPITAL | Age: 79
Discharge: HOME | End: 2024-05-23
Payer: COMMERCIAL

## 2024-05-23 DIAGNOSIS — Z95.810 ICD (IMPLANTABLE CARDIOVERTER-DEFIBRILLATOR) IN PLACE: ICD-10-CM

## 2024-05-23 DIAGNOSIS — I42.0 PRIMARY IDIOPATHIC DILATED CARDIOMYOPATHY (MULTI): ICD-10-CM

## 2024-05-23 PROCEDURE — 93296 REM INTERROG EVL PM/IDS: CPT

## 2024-05-23 PROCEDURE — 93295 DEV INTERROG REMOTE 1/2/MLT: CPT | Performed by: INTERNAL MEDICINE

## 2024-07-31 ENCOUNTER — TELEPHONE (OUTPATIENT)
Dept: CARDIOLOGY | Facility: CLINIC | Age: 79
End: 2024-07-31
Payer: COMMERCIAL

## 2024-07-31 NOTE — TELEPHONE ENCOUNTER
Patient called and LM stating he needs to be off of his Plavix and Eliquis d/t needing to get biopsies done from urology. Patient does not have a date yet. Routed to Angel Rivera RN

## 2024-08-08 NOTE — TELEPHONE ENCOUNTER
I LM for patient to wait for procedure to be formally scheduled then return call to our office to discuss blood thinner instructions.   Can also have clearance form (if needed) faxed to our office at that time.     Closing this note.

## 2024-08-22 NOTE — TELEPHONE ENCOUNTER
Patient called and LM stating his procedure is now scheduled for 9/6/24. Routed to Angel Rivera RN

## 2024-08-26 DIAGNOSIS — Z98.61 CAD S/P PERCUTANEOUS CORONARY ANGIOPLASTY: ICD-10-CM

## 2024-08-26 DIAGNOSIS — I25.10 CAD S/P PERCUTANEOUS CORONARY ANGIOPLASTY: ICD-10-CM

## 2024-08-27 RX ORDER — CLOPIDOGREL BISULFATE 75 MG/1
75 TABLET ORAL DAILY
Qty: 90 TABLET | Refills: 3 | Status: SHIPPED | OUTPATIENT
Start: 2024-08-27

## 2024-08-27 NOTE — TELEPHONE ENCOUNTER
Received request for prescription refills for patient.   Patient follows with Dr. Nelson     Request is for Plavix 75mg QD  Is patient currently on medication yes    Last OV 5/7/24  Next OV 11/19/24    Pended for signing and sent to provider

## 2024-08-28 ENCOUNTER — HOSPITAL ENCOUNTER (OUTPATIENT)
Dept: CARDIOLOGY | Facility: HOSPITAL | Age: 79
Discharge: HOME | End: 2024-08-28
Payer: COMMERCIAL

## 2024-08-28 DIAGNOSIS — I42.0 PRIMARY IDIOPATHIC DILATED CARDIOMYOPATHY (MULTI): ICD-10-CM

## 2024-08-28 DIAGNOSIS — Z95.810 ICD (IMPLANTABLE CARDIOVERTER-DEFIBRILLATOR) IN PLACE: ICD-10-CM

## 2024-08-28 PROCEDURE — 93296 REM INTERROG EVL PM/IDS: CPT

## 2024-08-28 PROCEDURE — 93295 DEV INTERROG REMOTE 1/2/MLT: CPT | Performed by: INTERNAL MEDICINE

## 2024-08-30 NOTE — TELEPHONE ENCOUNTER
Reviewed. I do not have clinical notes regarding these biopsies or what exactly is being done.     Patient last seen with Dr. Remington Nelson MD [facility] on 5/7/24:  Henry Ortiz is a 78 y.o. year old male patient doing well from cardiac standpoint feeling much better better on Entresto less short of breath and he is able to do more in his ER workup.  Did not have any symptoms of chest pain did not have any further shock of his ICD.  [facility] and appears that his GFR is slightly low PSA was elevated.  Advised him to follow-up with urology also to call his primary care physician regarding metformin use when the GFR is on the lower side.  He will follow-up with me in 6 months.     Patient is on Eliquis (pxAF) and Plavix (CAD) therapies.   Most recent GFR in the low 40s at [facility] in July 2024    Routing to Dr. Remington Nelson MD [facility] for input.

## 2024-09-04 NOTE — TELEPHONE ENCOUNTER
Remington Nelson MD  You19 hours ago (4:23 PM)     Okay for biopsy can hold Eliquis 3 days prior to procedure and Plavix 7 days preop

## 2024-10-21 DIAGNOSIS — I48.0 PAROXYSMAL ATRIAL FIBRILLATION WITH RAPID VENTRICULAR RESPONSE (MULTI): Primary | ICD-10-CM

## 2024-10-23 RX ORDER — AMIODARONE HYDROCHLORIDE 200 MG/1
200 TABLET ORAL DAILY
Qty: 90 TABLET | Refills: 3 | Status: SHIPPED | OUTPATIENT
Start: 2024-10-23

## 2024-11-19 ENCOUNTER — APPOINTMENT (OUTPATIENT)
Dept: CARDIOLOGY | Facility: CLINIC | Age: 79
End: 2024-11-19
Payer: COMMERCIAL

## 2024-11-19 VITALS
WEIGHT: 178.2 LBS | BODY MASS INDEX: 25.57 KG/M2 | DIASTOLIC BLOOD PRESSURE: 72 MMHG | HEART RATE: 68 BPM | SYSTOLIC BLOOD PRESSURE: 112 MMHG

## 2024-11-19 DIAGNOSIS — Z87.891 FORMER SMOKER: ICD-10-CM

## 2024-11-19 DIAGNOSIS — I10 ESSENTIAL HYPERTENSION, BENIGN: ICD-10-CM

## 2024-11-19 DIAGNOSIS — Z98.61 CAD S/P PERCUTANEOUS CORONARY ANGIOPLASTY: ICD-10-CM

## 2024-11-19 DIAGNOSIS — I65.23 STENOSIS OF BOTH EXTERNAL CAROTID ARTERIES: ICD-10-CM

## 2024-11-19 DIAGNOSIS — Z95.810 ICD (IMPLANTABLE CARDIOVERTER-DEFIBRILLATOR) IN PLACE: ICD-10-CM

## 2024-11-19 DIAGNOSIS — E11.9 TYPE 2 DIABETES MELLITUS WITHOUT COMPLICATION, WITHOUT LONG-TERM CURRENT USE OF INSULIN (MULTI): ICD-10-CM

## 2024-11-19 DIAGNOSIS — I25.10 CAD S/P PERCUTANEOUS CORONARY ANGIOPLASTY: ICD-10-CM

## 2024-11-19 DIAGNOSIS — E78.2 MIXED HYPERLIPIDEMIA: ICD-10-CM

## 2024-11-19 DIAGNOSIS — I48.0 PAROXYSMAL ATRIAL FIBRILLATION WITH RAPID VENTRICULAR RESPONSE (MULTI): ICD-10-CM

## 2024-11-19 DIAGNOSIS — I25.5 ISCHEMIC CARDIOMYOPATHY: ICD-10-CM

## 2024-11-19 PROBLEM — E11.22 CHRONIC KIDNEY DISEASE DUE TO TYPE 2 DIABETES MELLITUS (MULTI): Status: RESOLVED | Noted: 2023-01-13 | Resolved: 2024-11-19

## 2024-11-19 PROCEDURE — 3074F SYST BP LT 130 MM HG: CPT | Performed by: INTERNAL MEDICINE

## 2024-11-19 PROCEDURE — 3078F DIAST BP <80 MM HG: CPT | Performed by: INTERNAL MEDICINE

## 2024-11-19 PROCEDURE — 1036F TOBACCO NON-USER: CPT | Performed by: INTERNAL MEDICINE

## 2024-11-19 PROCEDURE — 99214 OFFICE O/P EST MOD 30 MIN: CPT | Performed by: INTERNAL MEDICINE

## 2024-11-19 PROCEDURE — 1159F MED LIST DOCD IN RCRD: CPT | Performed by: INTERNAL MEDICINE

## 2024-11-19 RX ORDER — METFORMIN HYDROCHLORIDE 500 MG/1
500 TABLET ORAL
COMMUNITY
Start: 2024-10-29

## 2024-11-19 NOTE — PROGRESS NOTES
Referred by Dr. Bruno ref. provider found provider found for   Chief Complaint   Patient presents with    Follow-up     6 month        History of Present Illness  Henry Ortiz is a 79 y.o. year old male patient doing well from a cardiac standpoint no complaint no symptoms of chest pain or shortness of breath.  Status post previous cardiac arrest status post previous ICD implantation.  Did not have any symptoms of palpitations syncope or presyncope.  I discussed with the patient in length we will continue medication will call for any problem at follow-up with the patient that he will need prostate biopsy and he wants it done at the hospital another surgical center.  Possibly safer because of extensive previous cardiac history.  Patient will see me back in few months    Past Medical History  History reviewed. No pertinent past medical history.    Social History  Social History     Tobacco Use    Smoking status: Former     Current packs/day: 0.00     Average packs/day: 1 pack/day for 20.0 years (20.0 ttl pk-yrs)     Types: Cigarettes     Start date:      Quit date:      Years since quittin.9    Smokeless tobacco: Never   Substance Use Topics    Alcohol use: Yes     Comment: rare    Drug use: Not Currently       Family History     Family History   Problem Relation Name Age of Onset    Heart attack Mother          acute    Cancer Father      Coronary artery disease Sister         Review of Systems  As per HPI, all other systems reviewed and negative.    Allergies:  Allergies   Allergen Reactions    Bee Venom Protein (Honey Bee) Other    Alogliptin Other        Outpatient Medications:  Current Outpatient Medications   Medication Instructions    ALPRAZolam (XANAX) 1 mg, Nightly PRN    amiodarone (PACERONE) 200 mg, oral, Daily    apixaban (ELIQUIS) 5 mg, 2 times daily    atorvastatin (Lipitor) 40 mg tablet 1 tablet, Nightly    clopidogrel (PLAVIX) 75 mg, oral, Daily    empagliflozin (JARDIANCE) 12.5 mg, Daily     ezetimibe (Zetia) 10 mg tablet 1 tablet, Nightly    fluticasone (Flonase) 50 mcg/actuation nasal spray 1 spray, 2 times daily    meclizine (Antivert) 25 mg tablet 1 tablet, 3 times daily PRN    metFORMIN (GLUCOPHAGE) 500 mg, Daily with breakfast    nitroglycerin (Nitrostat) 0.4 mg SL tablet Place under the tongue.    sacubitriL-valsartan (Entresto) 24-26 mg tablet 1 tablet, Daily    sildenafil (Viagra) 100 mg tablet Take by mouth. AS DIRECTED.    Tradjenta 5 mg, Daily         Vitals:  Vitals:    11/19/24 1204   BP: 112/72   Pulse: 68       Physical Exam:  Physical Exam  Vitals and nursing note reviewed.   Constitutional:       Appearance: Normal appearance.   HENT:      Head: Normocephalic and atraumatic.   Eyes:      Extraocular Movements: Extraocular movements intact.      Pupils: Pupils are equal, round, and reactive to light.   Cardiovascular:      Rate and Rhythm: Normal rate and regular rhythm.      Pulses: Normal pulses.   Pulmonary:      Effort: Pulmonary effort is normal.      Breath sounds: Normal breath sounds.   Musculoskeletal:         General: Normal range of motion.      Cervical back: Normal range of motion.      Right lower leg: No edema.      Left lower leg: No edema.   Skin:     General: Skin is warm and dry.   Neurological:      General: No focal deficit present.      Mental Status: He is alert and oriented to person, place, and time.             Assessment/Plan   Diagnoses and all orders for this visit:  CAD S/P percutaneous coronary angioplasty  ICD (implantable cardioverter-defibrillator) in place  Paroxysmal atrial fibrillation with rapid ventricular response (Multi)  Stenosis of both external carotid arteries  Ischemic cardiomyopathy  Essential hypertension, benign  Mixed hyperlipidemia  Type 2 diabetes mellitus without complication, without long-term current use of insulin (Multi)  Former smoker  BMI 25.0-25.9,adult          Remington Nelson MD [facility]  Interventional Cardiology   of  [facility] [place]     Thank you for allowing me to participate in the care of this patient. Please do not hesitate to contact me with any further questions or concerns.

## 2024-11-19 NOTE — PATIENT INSTRUCTIONS
Follow up office visit in 6 months.  Continue same medications/treatment.  Patient educated on proper medication use.  Patient educated on risk factor modification.  Please bring any lab results from other providers / physicians to your next appointment.    Please bring all medicines, vitamins and herbal supplements with you when you come to the office.    Prescriptions will not be filled unless you are compliant with your follow up appointments or have a follow up  appointment scheduled as per instruction of your physician.  Refills should be requested at the time of  Your visit.    Rosie JIMENEZ LPN, am scribing for and in the presence of  Dr. Remington Nelson MD, FACC

## 2024-12-05 ENCOUNTER — HOSPITAL ENCOUNTER (OUTPATIENT)
Dept: CARDIOLOGY | Facility: HOSPITAL | Age: 79
Discharge: HOME | End: 2024-12-05
Payer: COMMERCIAL

## 2024-12-05 DIAGNOSIS — I42.0 PRIMARY IDIOPATHIC DILATED CARDIOMYOPATHY (MULTI): ICD-10-CM

## 2024-12-05 DIAGNOSIS — Z95.810 ICD (IMPLANTABLE CARDIOVERTER-DEFIBRILLATOR) IN PLACE: ICD-10-CM

## 2024-12-05 PROCEDURE — 93296 REM INTERROG EVL PM/IDS: CPT

## 2024-12-05 PROCEDURE — 93295 DEV INTERROG REMOTE 1/2/MLT: CPT | Performed by: INTERNAL MEDICINE

## 2024-12-19 ENCOUNTER — TELEPHONE (OUTPATIENT)
Dept: CARDIOLOGY | Facility: CLINIC | Age: 79
End: 2024-12-19
Payer: COMMERCIAL

## 2024-12-19 DIAGNOSIS — Z95.810 ICD (IMPLANTABLE CARDIOVERTER-DEFIBRILLATOR) IN PLACE: ICD-10-CM

## 2025-02-18 ENCOUNTER — OFFICE VISIT (OUTPATIENT)
Dept: CARDIOLOGY | Facility: CLINIC | Age: 80
End: 2025-02-18
Payer: COMMERCIAL

## 2025-02-18 ENCOUNTER — HOSPITAL ENCOUNTER (OUTPATIENT)
Dept: CARDIOLOGY | Facility: HOSPITAL | Age: 80
Discharge: HOME | End: 2025-02-18
Payer: COMMERCIAL

## 2025-02-18 ENCOUNTER — APPOINTMENT (OUTPATIENT)
Dept: CARDIOLOGY | Facility: CLINIC | Age: 80
End: 2025-02-18
Payer: COMMERCIAL

## 2025-02-18 VITALS
OXYGEN SATURATION: 99 % | RESPIRATION RATE: 16 BRPM | BODY MASS INDEX: 25.34 KG/M2 | WEIGHT: 177 LBS | TEMPERATURE: 97.3 F | SYSTOLIC BLOOD PRESSURE: 108 MMHG | HEART RATE: 74 BPM | DIASTOLIC BLOOD PRESSURE: 65 MMHG | HEIGHT: 70 IN

## 2025-02-18 DIAGNOSIS — I10 ESSENTIAL (PRIMARY) HYPERTENSION: ICD-10-CM

## 2025-02-18 DIAGNOSIS — Z95.810 ICD (IMPLANTABLE CARDIOVERTER-DEFIBRILLATOR) IN PLACE: ICD-10-CM

## 2025-02-18 DIAGNOSIS — Z95.810 ICD (IMPLANTABLE CARDIOVERTER-DEFIBRILLATOR) IN PLACE: Primary | ICD-10-CM

## 2025-02-18 PROCEDURE — 93010 ELECTROCARDIOGRAM REPORT: CPT | Performed by: INTERNAL MEDICINE

## 2025-02-18 PROCEDURE — 99214 OFFICE O/P EST MOD 30 MIN: CPT | Mod: 25 | Performed by: INTERNAL MEDICINE

## 2025-02-18 PROCEDURE — 93005 ELECTROCARDIOGRAM TRACING: CPT | Performed by: INTERNAL MEDICINE

## 2025-02-18 PROCEDURE — 99204 OFFICE O/P NEW MOD 45 MIN: CPT | Performed by: INTERNAL MEDICINE

## 2025-02-18 PROCEDURE — 3078F DIAST BP <80 MM HG: CPT | Performed by: INTERNAL MEDICINE

## 2025-02-18 PROCEDURE — 3074F SYST BP LT 130 MM HG: CPT | Performed by: INTERNAL MEDICINE

## 2025-02-18 PROCEDURE — 1159F MED LIST DOCD IN RCRD: CPT | Performed by: INTERNAL MEDICINE

## 2025-02-18 PROCEDURE — G2211 COMPLEX E/M VISIT ADD ON: HCPCS | Performed by: INTERNAL MEDICINE

## 2025-02-18 PROCEDURE — 93283 PRGRMG EVAL IMPLANTABLE DFB: CPT

## 2025-02-18 NOTE — PROGRESS NOTES
"  Referring Provider: Derik Diez MD  Reason for Consult: ***    History of Present Illness:      Henry Ortiz is a 79 y.o. year old male patient with a history significant for ***  who is referred by *** for ***.    12.8% total vpacing  Had some slow monitored VT, nothing more than 30seconds    Since switching from sotalol to amiodarone, no more vt  The shocks happened during cardiac rehab    VT treatment szone added at 140 with ATPx4, shock,     Focused Cardiovascular Problem List:  ***  ***  ***      Past Medical and Surgical History:  Mr. Ortiz  has no past medical history on file.    has a past surgical history that includes Other surgical history (10/11/2021); Other surgical history (10/11/2021); Other surgical history (10/11/2021); Other surgical history (10/11/2021); Other surgical history (10/11/2021); Other surgical history (10/11/2021); Other surgical history (10/11/2021); Other surgical history (10/11/2021); Other surgical history (10/11/2021); Other surgical history (10/11/2021); Other surgical history (10/11/2021); and Other surgical history (2022).    Social History:  Social History     Tobacco Use    Smoking status: Former     Current packs/day: 0.00     Average packs/day: 1 pack/day for 20.0 years (20.0 ttl pk-yrs)     Types: Cigarettes     Start date:      Quit date:      Years since quittin.1    Smokeless tobacco: Never   Substance Use Topics    Alcohol use: Yes     Comment: rare      Tobacco: {SJTobacco:54898::\"Denies\"}  Alcohol: {ESAlcohol:66109}  Drug use:  {SJDRUGuse:04049::\"Denies\"}  Occupational History: ***  Other: Lives at ***    Relevant Family History:   Family History   Problem Relation Name Age of Onset    Heart attack Mother          acute    Cancer Father      Coronary artery disease Sister       Family History of Sudden Death: {Yes/No:69323}***  Family History of Arrhythmia: {Yes/No:30199}***     Allergies:  Allergies   Allergen Reactions    " "Bee Venom Protein (Honey Bee) Other    Alogliptin Other        Medications:  Current Outpatient Medications   Medication Instructions    ALPRAZolam (XANAX) 1 mg, Nightly PRN    amiodarone (PACERONE) 200 mg, oral, Daily    apixaban (ELIQUIS) 5 mg, 2 times daily    atorvastatin (Lipitor) 40 mg tablet 1 tablet, Nightly    clopidogrel (PLAVIX) 75 mg, oral, Daily    empagliflozin (JARDIANCE) 12.5 mg, Daily    ezetimibe (Zetia) 10 mg tablet 1 tablet, Nightly    fluticasone (Flonase) 50 mcg/actuation nasal spray 1 spray, 2 times daily    meclizine (Antivert) 25 mg tablet 1 tablet, 3 times daily PRN    metFORMIN (GLUCOPHAGE) 500 mg, Daily with breakfast    nitroglycerin (Nitrostat) 0.4 mg SL tablet Place under the tongue.    sacubitriL-valsartan (Entresto) 24-26 mg tablet 1 tablet, Daily    sildenafil (Viagra) 100 mg tablet Take by mouth. AS DIRECTED.    Tradjenta 5 mg, Daily         Objective   Physical Exam:  Last Recorded Vitals:      8/18/2023    10:32 AM 9/12/2023     2:49 PM 11/3/2023     3:37 PM 2/20/2024     2:09 PM 5/7/2024    11:11 AM 11/19/2024    12:04 PM 2/18/2025    11:48 AM   Vitals   Systolic 121 126 100 140 104 112 108   Diastolic 74 82 60 90 66 72 65   BP Location   Left arm Left arm Left arm Left arm    Heart Rate 65 71 84 85 66 68 74   Temp 35.6 °C (96 °F) 36.3 °C (97.3 °F)  36.3 °C (97.3 °F)   36.3 °C (97.3 °F)   Resp       16   Height 1.778 m (5' 10\") 1.778 m (5' 10\")  1.778 m (5' 10\") 1.778 m (5' 10\")  1.778 m (5' 10\")   Weight (lb) 166 165 166.4 170.2 170.3 178.2 177   BMI 23.82 kg/m2 23.68 kg/m2 23.88 kg/m2 24.42 kg/m2 24.44 kg/m2 25.57 kg/m2 25.4 kg/m2   BSA (m2) 1.93 m2 1.92 m2 1.93 m2 1.95 m2 1.95 m2 2 m2 1.99 m2   Visit Report   Report Report Report Report Report    Visit Vitals  /65   Pulse 74   Temp 36.3 °C (97.3 °F)   Resp 16   Ht 1.778 m (5' 10\")   Wt 80.3 kg (177 lb)   SpO2 99%   BMI 25.40 kg/m²   Smoking Status Former   BSA 1.99 m²      Gen: NAD, sitting comfortably  HEENT: " NC/AT  Chest: ***Device in situ in left upper chest, no overlying erythema or tenderness  Card: RRR, no m/r/g  Pulm: Clear to auscultation bilaterally  Ext: No LE edema  Neuro: No focal deficits    Diagnostic Results      My Independent nterpretation of Reviewed Study(s):  Prior ECGs (reviewed and my interpretation):   ***: ***    Cardiac Rhythm Monitors:  ***    Prior EP studies:  ***    Echocardiography:  ***  No echocardiogram results found for the past 12 months     Cardiac Catheterization:   ***    Stress Test:   ***    Other Relevant Imaging:  ***      Relevant Labs:  Lab Results   Component Value Date    CREATININE 1.71 (H) 08/26/2022    K 4.4 08/26/2022    HGB 13.3 (L) 08/26/2022       Assessment/Plan   Assessment and Plan:  Henry Ortiz is a 79 y.o. year old male patient who is referred for management and evaluation of ***.     #***  - ***    #***  -***  -***         Return to Clinic: {ESRTC:19870}    Thank you very much for allowing me to participate in the care of this patient. Please do not hesitate to contact me with any further questions or concerns.    Derik Diez MD  Clinical Cardiac Electrophysiologist, [facility] [place]    of Medicine, [employer]  Director of Atrial Fibrillation Ablation, [facility]  Director of Ventricular Arrhythmias Research, [facility]  Office Phone Number: [phone]          cardiomyopathy.  He is currently on long-term amiodarone.  His device check today did show some episodes of slower VT and his monitor VT zone, which was initially set at 150 to 188 bpm.    I have made some programming changes to his device.  I have added a VT 1 zone starting at 140 bpm with multiple rounds of increasingly aggressive ATP before shocking.  I have dropped the monitor zone 230 bpm.  This will hopefully be able to treat any slow VT that the patient has.  If he develops any sustained ventricular arrhythmias, he may be a candidate for catheter ablation in the future.  I did discuss this briefly with him today.  He wants to continue medical management for now.    Otherwise, the patient will continue amiodarone 200 mg daily for his ventricular arrhythmias, and his Eliquis 5 mg twice daily for his paroxysmal atrial fibrillation.       Return to Clinic: Patient should return to the EP Clinic in 6 months     Thank you very much for allowing me to participate in the care of this patient. Please do not hesitate to contact me with any further questions or concerns.    Derik Diez MD  Clinical Cardiac Electrophysiologist, [facility] [place]    of Medicine, [facility] [employer]  Director of Atrial Fibrillation Ablation, [facility]  Director of Ventricular Arrhythmias Research, [facility]  Office Phone Number: [phone]

## 2025-05-20 ENCOUNTER — APPOINTMENT (OUTPATIENT)
Dept: CARDIOLOGY | Facility: CLINIC | Age: 80
End: 2025-05-20
Payer: COMMERCIAL

## 2025-05-20 VITALS
DIASTOLIC BLOOD PRESSURE: 64 MMHG | SYSTOLIC BLOOD PRESSURE: 114 MMHG | HEIGHT: 70 IN | HEART RATE: 70 BPM | WEIGHT: 177.5 LBS | BODY MASS INDEX: 25.41 KG/M2

## 2025-05-20 DIAGNOSIS — E11.9 TYPE 2 DIABETES MELLITUS WITHOUT COMPLICATION, WITHOUT LONG-TERM CURRENT USE OF INSULIN: ICD-10-CM

## 2025-05-20 DIAGNOSIS — I47.20 VENTRICULAR TACHYCARDIA (PAROXYSMAL): ICD-10-CM

## 2025-05-20 DIAGNOSIS — K21.00 GASTROESOPHAGEAL REFLUX DISEASE WITH ESOPHAGITIS, UNSPECIFIED WHETHER HEMORRHAGE: ICD-10-CM

## 2025-05-20 DIAGNOSIS — G51.0 BELL'S PALSY: ICD-10-CM

## 2025-05-20 DIAGNOSIS — I65.23 STENOSIS OF BOTH EXTERNAL CAROTID ARTERIES: ICD-10-CM

## 2025-05-20 DIAGNOSIS — Z95.810 ICD (IMPLANTABLE CARDIOVERTER-DEFIBRILLATOR) IN PLACE: ICD-10-CM

## 2025-05-20 DIAGNOSIS — I25.5 ISCHEMIC CARDIOMYOPATHY: ICD-10-CM

## 2025-05-20 DIAGNOSIS — E78.2 MIXED HYPERLIPIDEMIA: ICD-10-CM

## 2025-05-20 DIAGNOSIS — Z87.891 FORMER SMOKER: ICD-10-CM

## 2025-05-20 DIAGNOSIS — I10 ESSENTIAL HYPERTENSION, BENIGN: ICD-10-CM

## 2025-05-20 DIAGNOSIS — I48.0 PAROXYSMAL ATRIAL FIBRILLATION WITH RAPID VENTRICULAR RESPONSE (MULTI): ICD-10-CM

## 2025-05-20 DIAGNOSIS — I25.10 CAD S/P PERCUTANEOUS CORONARY ANGIOPLASTY: ICD-10-CM

## 2025-05-20 DIAGNOSIS — Z98.61 CAD S/P PERCUTANEOUS CORONARY ANGIOPLASTY: ICD-10-CM

## 2025-05-20 PROBLEM — I25.810 CORONARY ARTERY DISEASE INVOLVING CORONARY BYPASS GRAFT OF NATIVE HEART: Status: RESOLVED | Noted: 2023-11-02 | Resolved: 2025-05-20

## 2025-05-20 PROBLEM — I46.9 CARDIAC ARREST: Status: RESOLVED | Noted: 2019-12-26 | Resolved: 2025-05-20

## 2025-05-20 PROBLEM — K21.9 GERD (GASTROESOPHAGEAL REFLUX DISEASE): Status: RESOLVED | Noted: 2023-01-13 | Resolved: 2025-05-20

## 2025-05-20 PROBLEM — I77.9 CAROTID ARTERY DISEASE: Status: RESOLVED | Noted: 2023-11-03 | Resolved: 2025-05-20

## 2025-05-20 PROCEDURE — 99213 OFFICE O/P EST LOW 20 MIN: CPT | Performed by: INTERNAL MEDICINE

## 2025-05-20 PROCEDURE — 1036F TOBACCO NON-USER: CPT | Performed by: INTERNAL MEDICINE

## 2025-05-20 PROCEDURE — 3074F SYST BP LT 130 MM HG: CPT | Performed by: INTERNAL MEDICINE

## 2025-05-20 PROCEDURE — 3078F DIAST BP <80 MM HG: CPT | Performed by: INTERNAL MEDICINE

## 2025-05-20 PROCEDURE — 1159F MED LIST DOCD IN RCRD: CPT | Performed by: INTERNAL MEDICINE

## 2025-05-20 NOTE — PATIENT INSTRUCTIONS
Patient to follow up in 9 months with Dr. Remington Nelson MD FACC     No other changes today.   Continue same medications and treatments.   Patient educated on proper medication use.   Patient educated on risk factor modification.   Please bring any lab results from other providers / physicians to your next appointment.     Please bring all medicines, vitamins, and herbal supplements with you when you come to the office.     Prescriptions will not be filled unless you are compliant with your follow up appointments or have a follow up appointment scheduled as per instruction of your physician. Refills should be requested at the time of your visit.    Angel JIMENEZ RN am scribing for and in the presence of Dr. Remington Nelson MD FACC

## 2025-05-20 NOTE — PROGRESS NOTES
Referred by Dr. Bruno ref. provider found provider found for   Chief Complaint   Patient presents with    Follow-up     6 month follow up on coronary artery disease, paroxysmal atrial fibrillation with RVR, carotid artery stenosis, essential hypertension, and mixed hyperlipidemia management        History of Present Illness  Henry Ortiz is a 79 y.o. year old male patient for follow-up with history of coronary artery bypass surgery history of coronary disease history of heart failure status post previous ICD implantation.  Diabetes adequately controlled.  Did not have any symptoms of chest pain palpitations syncope presyncope.  He is seen in EP service because of his ICD implant.  Discussed with the patient we will continue medication call for any problem and follow-up as scheduled    Past Medical History  Medical History[1]    Social History  Social History[2]    Family History   Family History[3]    Review of Systems  As per HPI, all other systems reviewed and negative.    Allergies:  RX Allergies[4]     Outpatient Medications:  Current Outpatient Medications   Medication Instructions    ALPRAZolam (XANAX) 1 mg, Nightly PRN    amiodarone (PACERONE) 200 mg, oral, Daily    apixaban (ELIQUIS) 5 mg, 2 times daily    atorvastatin (Lipitor) 40 mg tablet 1 tablet, Nightly    clopidogrel (PLAVIX) 75 mg, oral, Daily    empagliflozin (JARDIANCE) 12.5 mg, Daily    ezetimibe (Zetia) 10 mg tablet 1 tablet, Nightly    fluticasone (Flonase) 50 mcg/actuation nasal spray 1 spray, 2 times daily    meclizine (Antivert) 25 mg tablet 1 tablet, 3 times daily PRN    metFORMIN (GLUCOPHAGE) 500 mg, Daily with breakfast    nitroglycerin (Nitrostat) 0.4 mg SL tablet Place under the tongue.    sacubitriL-valsartan (Entresto) 24-26 mg tablet 1 tablet, Daily    sildenafil (Viagra) 100 mg tablet Take by mouth. AS DIRECTED.    Tradjenta 5 mg, Daily         Vitals:  Vitals:    05/20/25 1405   BP: 114/64   Pulse: 70       Physical Exam:  Physical  Exam  Constitutional:       Appearance: Normal appearance.   HENT:      Head: Normocephalic and atraumatic.   Eyes:      Extraocular Movements: Extraocular movements intact.      Pupils: Pupils are equal, round, and reactive to light.   Cardiovascular:      Rate and Rhythm: Normal rate and regular rhythm.      Pulses: Normal pulses.      Heart sounds: Normal heart sounds.   Pulmonary:      Effort: Pulmonary effort is normal.      Breath sounds: Normal breath sounds.   Abdominal:      General: Abdomen is flat.      Palpations: Abdomen is soft.   Musculoskeletal:      Right lower leg: No edema.      Left lower leg: No edema.   Skin:     General: Skin is warm and dry.   Neurological:      General: No focal deficit present.      Mental Status: He is alert and oriented to person, place, and time.             Assessment/Plan   Diagnoses and all orders for this visit:  CAD S/P percutaneous coronary angioplasty  Essential hypertension, benign  Mixed hyperlipidemia  ICD (implantable cardioverter-defibrillator) in place  Ischemic cardiomyopathy  Paroxysmal atrial fibrillation with rapid ventricular response (Multi)  Stenosis of both external carotid arteries  Ventricular tachycardia (paroxysmal)  BMI 25.0-25.9,adult  Type 2 diabetes mellitus without complication, without long-term current use of insulin  Gastroesophageal reflux disease with esophagitis, unspecified whether hemorrhage  Bell's palsy  Former smoker      IAngel RN   am scribing for, and in the presence of Dr. [facility]ignton Nelson [facility] .    I, Dr. Remington Nelson MD [facility] , personally performed the services described in the documentation as scribed by Angel Rivera RN   in my presence, and confirm it is both accurate and complete.      Remington Nelson MD FACC  Interventional Cardiology   of [facility]     Thank you for allowing me to participate in the care of this patient. Please do not hesitate to contact me with any further questions or  concerns.         [1]   Past Medical History:  Diagnosis Date    Mixed hyperlipidemia     Paroxysmal A-fib (Multi)    [2]   Social History  Tobacco Use    Smoking status: Former     Current packs/day: 0.00     Average packs/day: 1 pack/day for 20.0 years (20.0 ttl pk-yrs)     Types: Cigarettes     Start date:      Quit date:      Years since quittin.4    Smokeless tobacco: Never   Substance Use Topics    Alcohol use: Yes     Comment: rare    Drug use: Not Currently   [3]   Family History  Problem Relation Name Age of Onset    Heart attack Mother          acute    Cancer Father      Coronary artery disease Sister     [4]   Allergies  Allergen Reactions    Bee Venom Protein (Honey Bee) Other    Alogliptin Other

## 2025-05-28 ENCOUNTER — HOSPITAL ENCOUNTER (OUTPATIENT)
Dept: CARDIOLOGY | Facility: HOSPITAL | Age: 80
Discharge: HOME | End: 2025-05-28
Payer: COMMERCIAL

## 2025-05-28 DIAGNOSIS — Z95.810 PRESENCE OF AUTOMATIC CARDIOVERTER/DEFIBRILLATOR (AICD): ICD-10-CM

## 2025-05-28 DIAGNOSIS — I42.9 CARDIOMYOPATHY, UNSPECIFIED TYPE (MULTI): ICD-10-CM

## 2025-05-28 PROCEDURE — 93296 REM INTERROG EVL PM/IDS: CPT

## 2025-07-07 ENCOUNTER — HOSPITAL ENCOUNTER (OUTPATIENT)
Dept: CARDIOLOGY | Facility: HOSPITAL | Age: 80
Discharge: HOME | End: 2025-07-07
Payer: COMMERCIAL

## 2025-07-07 DIAGNOSIS — I42.9 CARDIOMYOPATHY, UNSPECIFIED TYPE (MULTI): ICD-10-CM

## 2025-07-07 DIAGNOSIS — Z95.810 PRESENCE OF AUTOMATIC CARDIOVERTER/DEFIBRILLATOR (AICD): ICD-10-CM

## 2025-08-10 DIAGNOSIS — I48.0 PAROXYSMAL ATRIAL FIBRILLATION WITH RAPID VENTRICULAR RESPONSE (MULTI): ICD-10-CM

## 2025-08-12 RX ORDER — AMIODARONE HYDROCHLORIDE 200 MG/1
200 TABLET ORAL DAILY
Qty: 90 TABLET | Refills: 1 | Status: SHIPPED | OUTPATIENT
Start: 2025-08-12

## 2025-08-19 ENCOUNTER — HOSPITAL ENCOUNTER (OUTPATIENT)
Dept: CARDIOLOGY | Facility: HOSPITAL | Age: 80
Discharge: HOME | End: 2025-08-19
Payer: COMMERCIAL

## 2025-08-19 ENCOUNTER — APPOINTMENT (OUTPATIENT)
Dept: CARDIOLOGY | Facility: CLINIC | Age: 80
End: 2025-08-19
Payer: COMMERCIAL

## 2025-08-19 VITALS
DIASTOLIC BLOOD PRESSURE: 60 MMHG | WEIGHT: 162 LBS | HEART RATE: 73 BPM | SYSTOLIC BLOOD PRESSURE: 124 MMHG | HEIGHT: 70 IN | BODY MASS INDEX: 23.19 KG/M2

## 2025-08-19 DIAGNOSIS — R53.83 OTHER FATIGUE: ICD-10-CM

## 2025-08-19 DIAGNOSIS — Z95.810 ICD (IMPLANTABLE CARDIOVERTER-DEFIBRILLATOR) IN PLACE: Primary | ICD-10-CM

## 2025-08-19 DIAGNOSIS — Z95.810 ICD (IMPLANTABLE CARDIOVERTER-DEFIBRILLATOR) IN PLACE: ICD-10-CM

## 2025-08-19 PROCEDURE — 93283 PRGRMG EVAL IMPLANTABLE DFB: CPT

## 2025-08-19 RX ORDER — PREDNISONE 10 MG/1
10 TABLET ORAL
COMMUNITY
Start: 2025-07-21

## 2025-08-19 RX ORDER — ACETAMINOPHEN 325 MG/1
650 TABLET ORAL
COMMUNITY
Start: 2025-07-21

## 2025-08-19 RX ORDER — METHOCARBAMOL 750 MG/1
750 TABLET, FILM COATED ORAL
COMMUNITY
Start: 2025-07-21

## 2026-02-17 ENCOUNTER — APPOINTMENT (OUTPATIENT)
Dept: CARDIOLOGY | Facility: CLINIC | Age: 81
End: 2026-02-17
Payer: COMMERCIAL

## 2026-08-18 ENCOUNTER — APPOINTMENT (OUTPATIENT)
Dept: CARDIOLOGY | Facility: CLINIC | Age: 81
End: 2026-08-18
Payer: COMMERCIAL